# Patient Record
(demographics unavailable — no encounter records)

---

## 2025-04-17 NOTE — DISCUSSION/SUMMARY
[de-identified] : 63-year-old woman with right subtrochanteric hip fracture nonunion, s/p right hip IM nail by Dr. Tellez, approximately 2 years and 9 months out, no with left foot tendinitis.   -Physical exam and x-ray findings were discussed with the patient.  -Weightbearing as tolerated right LE -Physical therapy: to improve ROM and strength, gait training -Voltaren gel for pain -Follow-up as needed with x-rays of the right femur and AP pelvis at that time. -All the patient's questions and concerns were addressed during this visit

## 2025-04-17 NOTE — DISCUSSION/SUMMARY
[de-identified] : 63-year-old woman with right subtrochanteric hip fracture nonunion, s/p right hip IM nail by Dr. Tellez, approximately 2 years and 9 months out, no with left foot tendinitis.   -Physical exam and x-ray findings were discussed with the patient.  -Weightbearing as tolerated right LE -Physical therapy: to improve ROM and strength, gait training -Voltaren gel for pain -Follow-up as needed with x-rays of the right femur and AP pelvis at that time. -All the patient's questions and concerns were addressed during this visit

## 2025-04-17 NOTE — REASON FOR VISIT
[Follow-Up Visit] : a follow-up visit for [FreeTextEntry2] : s/p right cephalomedullary nailing of right hip with a recon nail with Dr. Tellez

## 2025-04-17 NOTE — PHYSICAL EXAM
[de-identified] : The patient is sitting comfortably in the exam room.  Right hip -Skin is intact, no swelling, no ecchymosis -No tenderness to palpation over the greater trochanter -Painless range of motion hip -Sensation is intact L1-S1 -5/5 EHL, FHL, TA, GS, quadriceps, hamstrings -Foot is warm and well-perfused, palpable dorsalis pedis pulse  The patient is sitting comfortably in the exam room.  LEFT ankle -Skin is intact, no swelling, no ecchymosis -No pain with palpation over the medial malleolus -No pain with palpation over the dorsum of the foot, no plantar ecchymoses, no pain with movement of the first metatarsal relative to the second metatarsal -Mild pain with palpation along the lateral aspect of the left foot. -No subluxation of the peroneal tendons -Dorsiflexion: 5, Plantarflexion: 45 -Sensation is intact L1-S1 -5/5 EHL, FHL, TA, GS -Foot is warm and well-perfused, palpable dorsalis pedis pulse  [de-identified] : Xrays of the right femur and AP pelvis were taken in the office today, 4/17/25.  AP and lateral of femur.  Femur x-rays show persistent nonunion at the subtrochanteric region.  The overall alignment of the hip looks good.  Implants are in good position.  No significant change from previous imaging.

## 2025-04-17 NOTE — HISTORY OF PRESENT ILLNESS
[de-identified] : MARNIE Espinoza is a 63 y.o. woman here for follow-up s/p right cephalomedullary nailing of right hip with a recon nail on 7/2022 with Dr. Tellez. Since her last visit she states she is feeling better. She notes mild pain at the lateral aspect of the right hip and limps occasionally. She had a twisting injury to the left foot about a month ago and notes pain at the lateral aspect of the left foot. She has not participated in PT for about a year.

## 2025-04-17 NOTE — PHYSICAL EXAM
[de-identified] : The patient is sitting comfortably in the exam room.  Right hip -Skin is intact, no swelling, no ecchymosis -No tenderness to palpation over the greater trochanter -Painless range of motion hip -Sensation is intact L1-S1 -5/5 EHL, FHL, TA, GS, quadriceps, hamstrings -Foot is warm and well-perfused, palpable dorsalis pedis pulse  The patient is sitting comfortably in the exam room.  LEFT ankle -Skin is intact, no swelling, no ecchymosis -No pain with palpation over the medial malleolus -No pain with palpation over the dorsum of the foot, no plantar ecchymoses, no pain with movement of the first metatarsal relative to the second metatarsal -Mild pain with palpation along the lateral aspect of the left foot. -No subluxation of the peroneal tendons -Dorsiflexion: 5, Plantarflexion: 45 -Sensation is intact L1-S1 -5/5 EHL, FHL, TA, GS -Foot is warm and well-perfused, palpable dorsalis pedis pulse  [de-identified] : Xrays of the right femur and AP pelvis were taken in the office today, 4/17/25.  AP and lateral of femur.  Femur x-rays show persistent nonunion at the subtrochanteric region.  The overall alignment of the hip looks good.  Implants are in good position.  No significant change from previous imaging.

## 2025-04-17 NOTE — HISTORY OF PRESENT ILLNESS
[de-identified] : MARNIE Espinoza is a 63 y.o. woman here for follow-up s/p right cephalomedullary nailing of right hip with a recon nail on 7/2022 with Dr. Tellez. Since her last visit she states she is feeling better. She notes mild pain at the lateral aspect of the right hip and limps occasionally. She had a twisting injury to the left foot about a month ago and notes pain at the lateral aspect of the left foot. She has not participated in PT for about a year.